# Patient Record
Sex: MALE | Race: AMERICAN INDIAN OR ALASKA NATIVE
[De-identification: names, ages, dates, MRNs, and addresses within clinical notes are randomized per-mention and may not be internally consistent; named-entity substitution may affect disease eponyms.]

---

## 2018-10-02 ENCOUNTER — HOSPITAL ENCOUNTER (EMERGENCY)
Dept: HOSPITAL 5 - ED | Age: 43
Discharge: HOME | End: 2018-10-02
Payer: SELF-PAY

## 2018-10-02 VITALS — DIASTOLIC BLOOD PRESSURE: 97 MMHG | SYSTOLIC BLOOD PRESSURE: 145 MMHG

## 2018-10-02 DIAGNOSIS — R04.0: Primary | ICD-10-CM

## 2018-10-02 DIAGNOSIS — I10: ICD-10-CM

## 2018-10-02 DIAGNOSIS — F17.200: ICD-10-CM

## 2018-10-02 LAB
APTT BLD: 28 SEC. (ref 24.2–36.6)
BASOPHILS # (AUTO): 0.1 K/MM3 (ref 0–0.1)
BASOPHILS NFR BLD AUTO: 1 % (ref 0–1.8)
BUN SERPL-MCNC: 9 MG/DL (ref 9–20)
BUN/CREAT SERPL: 8 %
CALCIUM SERPL-MCNC: 9.8 MG/DL (ref 8.4–10.2)
EOSINOPHIL # BLD AUTO: 0.2 K/MM3 (ref 0–0.4)
EOSINOPHIL NFR BLD AUTO: 1.3 % (ref 0–4.3)
HCT VFR BLD CALC: 54.8 % (ref 35.5–45.6)
HEMOLYSIS INDEX: 33
HGB BLD-MCNC: 18.4 GM/DL (ref 11.8–15.2)
INR PPP: 0.93 (ref 0.87–1.13)
LYMPHOCYTES # BLD AUTO: 3.6 K/MM3 (ref 1.2–5.4)
LYMPHOCYTES NFR BLD AUTO: 24.7 % (ref 13.4–35)
MCH RBC QN AUTO: 31 PG (ref 28–32)
MCHC RBC AUTO-ENTMCNC: 34 % (ref 32–34)
MCV RBC AUTO: 93 FL (ref 84–94)
MONOCYTES # (AUTO): 1.1 K/MM3 (ref 0–0.8)
MONOCYTES % (AUTO): 7.7 % (ref 0–7.3)
PLATELET # BLD: 172 K/MM3 (ref 140–440)
RBC # BLD AUTO: 5.87 M/MM3 (ref 3.65–5.03)

## 2018-10-02 PROCEDURE — 93010 ELECTROCARDIOGRAM REPORT: CPT

## 2018-10-02 PROCEDURE — 93005 ELECTROCARDIOGRAM TRACING: CPT

## 2018-10-02 PROCEDURE — 80048 BASIC METABOLIC PNL TOTAL CA: CPT

## 2018-10-02 PROCEDURE — 84484 ASSAY OF TROPONIN QUANT: CPT

## 2018-10-02 PROCEDURE — 85610 PROTHROMBIN TIME: CPT

## 2018-10-02 PROCEDURE — 85025 COMPLETE CBC W/AUTO DIFF WBC: CPT

## 2018-10-02 PROCEDURE — 85730 THROMBOPLASTIN TIME PARTIAL: CPT

## 2018-10-02 PROCEDURE — 36415 COLL VENOUS BLD VENIPUNCTURE: CPT

## 2018-10-02 NOTE — EMERGENCY DEPARTMENT REPORT
ED ENT HPI





- General


Chief complaint: Nosebleed


Stated complaint: NOSE BLEED


Time Seen by Provider: 10/02/18 07:02


Source: patient


Mode of arrival: Ambulatory


Limitations: No Limitations





- History of Present Illness


Initial comments: 





43 year old male with a past medical history of hypertension off meds for 2 

years presents to the hospital complaining of and 3 episodes of epistaxis (left 

nostril) since last night.  No further bleeding since arrival to the ED (over 

the last 6 hours).  Patient states that over the last 2-3 days assessment and 

pain across his chest worse and lying on side.  He denies nausea, vomiting, 

shortness of breath, calf tenderness, leg edema, headache, or blurred vision.  

He denies taking any blood thinners including aspirin, Plavix, or Goody 

powders.  Patient states he also recently started smoking cigarettes.  

Significant other also expressed concerns that the patient snores and appears 

to have intermittent trouble breathing throughout the night.  I'll





- Related Data


 Previous Rx's











 Medication  Instructions  Recorded  Last Taken  Type


 


amLODIPine [Norvasc] 5 mg PO DAILY #30 tab 10/02/18 Unknown Rx











 Allergies











Allergy/AdvReac Type Severity Reaction Status Date / Time


 


No Known Allergies Allergy   Verified 10/02/18 01:11














ED Dental HPI





- General


Chief complaint: Nosebleed


Stated complaint: NOSE BLEED


Time Seen by Provider: 10/02/18 07:02


Source: patient


Mode of arrival: Ambulatory


Limitations: No Limitations





- Related Data


 Previous Rx's











 Medication  Instructions  Recorded  Last Taken  Type


 


amLODIPine [Norvasc] 5 mg PO DAILY #30 tab 10/02/18 Unknown Rx











 Allergies











Allergy/AdvReac Type Severity Reaction Status Date / Time


 


No Known Allergies Allergy   Verified 10/02/18 01:11














ED Review of Systems


ROS: 


Stated complaint: NOSE BLEED


Other details as noted in HPI





Comment: All other systems reviewed and negative





ED Past Medical Hx





- Past Medical History


Previous Medical History?: Yes


Hx Hypertension: Yes





- Surgical History


Past Surgical History?: No





- Social History


Smoking Status: Current Every Day Smoker


Substance Use Type: Alcohol





- Medications


Home Medications: 


 Home Medications











 Medication  Instructions  Recorded  Confirmed  Last Taken  Type


 


amLODIPine [Norvasc] 5 mg PO DAILY #30 tab 10/02/18  Unknown Rx














ED Physical Exam





- General


Limitations: No Limitations





- Other


Other exam information: 





General: No limitations, patient is alert in no acute distress


Head exam: Atraumatic, normocephalic


Eyes exam: Normal appearance, pupils equal reactive to light, extraocular 

movements intact


ENT: Moist mucous membrane, some mild bilateral nasal congestion with no signs 

of active bleeding


Neck exam: Normal inspection, full range of motion, no meningismus nontender


Respiratory exam: Clear to auscultation bilateral, no wheezes, rales, crackles


Cardiovascular: Normal rate and rhythm, normal heart sounds


Abdomen: Soft, nondistended, and  nontender, with normal bowel sounds, no 

rebound, or guarding


Extremity: Full range of motion normal inspection no deformity


Back: Normal Inspection, full range of motion, no tenderness


Neurologic: Alert, oriented x3, cranial nerves intact, no motor or sensory 

deficit


Psychiatric: normal affect, normal mood


Skin: Warm, dry, intact





ED Course


 Vital Signs











  10/02/18 10/02/18 10/02/18





  01:07 04:08 04:10


 


Temperature 98.7 F  98.3 F


 


Pulse Rate 67  61


 


Respiratory 18  17





Rate   


 


Blood Pressure 165/118  


 


Blood Pressure   176/114





[Left]   


 


O2 Sat by Pulse 98 98 99





Oximetry   














  10/02/18 10/02/18 10/02/18





  04:38 06:00 07:01


 


Temperature   97.8 F


 


Pulse Rate   59 L


 


Respiratory 16  16





Rate   


 


Blood Pressure  152/102 


 


Blood Pressure   160/103





[Left]   


 


O2 Sat by Pulse 97 97 98





Oximetry   














  10/02/18 10/02/18





  07:58 09:01


 


Temperature 97.7 F 


 


Pulse Rate  


 


Respiratory  





Rate  


 


Blood Pressure  


 


Blood Pressure  145/97





[Left]  


 


O2 Sat by Pulse  





Oximetry  














ED Medical Decision Making





- Lab Data


Result diagrams: 


 10/02/18 01:52





 10/02/18 01:52








 Lab Results











  10/02/18 10/02/18 10/02/18 Range/Units





  01:52 01:52 01:52 


 


WBC  14.4 H    (4.5-11.0)  K/mm3


 


RBC  5.87 H    (3.65-5.03)  M/mm3


 


Hgb  18.4 H    (11.8-15.2)  gm/dl


 


Hct  54.8 H    (35.5-45.6)  %


 


MCV  93    (84-94)  fl


 


MCH  31    (28-32)  pg


 


MCHC  34    (32-34)  %


 


RDW  15.0    (13.2-15.2)  %


 


Plt Count  172    (140-440)  K/mm3


 


Lymph % (Auto)  24.7    (13.4-35.0)  %


 


Mono % (Auto)  7.7 H    (0.0-7.3)  %


 


Eos % (Auto)  1.3    (0.0-4.3)  %


 


Baso % (Auto)  1.0    (0.0-1.8)  %


 


Lymph #  3.6    (1.2-5.4)  K/mm3


 


Mono #  1.1 H    (0.0-0.8)  K/mm3


 


Eos #  0.2    (0.0-0.4)  K/mm3


 


Baso #  0.1    (0.0-0.1)  K/mm3


 


Seg Neutrophils %  65.3    (40.0-70.0)  %


 


Seg Neutrophils #  9.4 H    (1.8-7.7)  K/mm3


 


PT   12.9   (12.2-14.9)  Sec.


 


INR   0.93   (0.87-1.13)  


 


APTT   28.0   (24.2-36.6)  Sec.


 


Sodium    142  (137-145)  mmol/L


 


Potassium    4.2  (3.6-5.0)  mmol/L


 


Chloride    103.5  ()  mmol/L


 


Carbon Dioxide    28  (22-30)  mmol/L


 


Anion Gap    15  mmol/L


 


BUN    9  (9-20)  mg/dL


 


Creatinine    1.1  (0.8-1.5)  mg/dL


 


Estimated GFR    > 60  ml/min


 


BUN/Creatinine Ratio    8  %


 


Glucose    113 H  ()  mg/dL


 


Calcium    9.8  (8.4-10.2)  mg/dL


 


Troponin T    < 0.010  (0.00-0.029)  ng/mL














  10/02/18 Range/Units





  04:11 


 


WBC   (4.5-11.0)  K/mm3


 


RBC   (3.65-5.03)  M/mm3


 


Hgb   (11.8-15.2)  gm/dl


 


Hct   (35.5-45.6)  %


 


MCV   (84-94)  fl


 


MCH   (28-32)  pg


 


MCHC   (32-34)  %


 


RDW   (13.2-15.2)  %


 


Plt Count   (140-440)  K/mm3


 


Lymph % (Auto)   (13.4-35.0)  %


 


Mono % (Auto)   (0.0-7.3)  %


 


Eos % (Auto)   (0.0-4.3)  %


 


Baso % (Auto)   (0.0-1.8)  %


 


Lymph #   (1.2-5.4)  K/mm3


 


Mono #   (0.0-0.8)  K/mm3


 


Eos #   (0.0-0.4)  K/mm3


 


Baso #   (0.0-0.1)  K/mm3


 


Seg Neutrophils %   (40.0-70.0)  %


 


Seg Neutrophils #   (1.8-7.7)  K/mm3


 


PT   (12.2-14.9)  Sec.


 


INR   (0.87-1.13)  


 


APTT   (24.2-36.6)  Sec.


 


Sodium   (137-145)  mmol/L


 


Potassium   (3.6-5.0)  mmol/L


 


Chloride   ()  mmol/L


 


Carbon Dioxide   (22-30)  mmol/L


 


Anion Gap   mmol/L


 


BUN   (9-20)  mg/dL


 


Creatinine   (0.8-1.5)  mg/dL


 


Estimated GFR   ml/min


 


BUN/Creatinine Ratio   %


 


Glucose   ()  mg/dL


 


Calcium   (8.4-10.2)  mg/dL


 


Troponin T  < 0.010  (0.00-0.029)  ng/mL














- EKG Data


-: EKG Interpreted by Me (ricky)


EKG shows normal: sinus rhythm, axis (qrs -57), QRS complexes (qrs 96), ST-T 

waves (no stemi/t inv)


Rate: normal (64)





- EKG Data


When compared to previous EKG there are: previous EKG unavailable





10/02/18 07:30


Repeat ED performed at 6:13 AM without acute changes compared to previous





- Medical Decision Making





Patient states since being in the ED has not had any chest pain.  EKG without 

ischemia and 3 negative cardiac enzymes.  No coagulopathy or renal 

insufficiency identified all labs.  Patient received hydralazine by mouth for 

hypertension.  No signs of hypertensive emergency at this time.  Will be 

started on the medication and encouraged follow-up. It Also it is possible the 

patient has sleep apnea based on his description of his significant other.  

Further workup by PMD recommended.





bp improved with hydralzine, pt asx, will d/c





- Differential Diagnosis


coagulopathy, hypertensive emergency, uremia, med noncompliance


Critical Care Time: No


Critical care attestation.: 


If time is entered above; I have spent that time in minutes in the direct care 

of this critically ill patient, excluding procedure time.








ED Disposition


Clinical Impression: 


 Epistaxis, Uncontrolled hypertension, Noncompliance with medication regimen





Disposition: DC-01 TO HOME OR SELFCARE


Is pt being admited?: No


Does the pt Need Aspirin: No


Condition: Stable


Instructions:  Epistaxis (ED), Hypertension (ED)


Additional Instructions: 


Take the medication as prescribed.  Follow up with your doctor or the doctor/

clinic provided.  Return if symptoms worsen as indicated by your discharge 

instructions.  You were started on Norvasc 5 mg.  The dose may need to be 

increased to 10 mg depending on your blood pressure response.  Please continue 

to monitor your blood pressure at home and follow-up.  Follow up with a primary 

care doctor regarding your concerns of possible sleep apnea.  You will need an 

outpatient sleep study to diagnosis this condition.


Prescriptions: 


amLODIPine [Norvasc] 5 mg PO DAILY #30 tab


Referrals: 


BIBI MONDRAGON MD [Primary Care Provider] - 3-5 Days


[facility] [Provider Group] - 3-5 Days (Primary care clinic)


ROLANDO BATEMAN MD [Staff Physician] - 3-5 Days (Primary care doctor)


Time of Disposition: 09:01